# Patient Record
Sex: FEMALE | Race: WHITE | URBAN - METROPOLITAN AREA
[De-identification: names, ages, dates, MRNs, and addresses within clinical notes are randomized per-mention and may not be internally consistent; named-entity substitution may affect disease eponyms.]

---

## 2018-11-08 ENCOUNTER — TELEPHONE (OUTPATIENT)
Dept: FAMILY MEDICINE CLINIC | Facility: CLINIC | Age: 63
End: 2018-11-08

## 2018-11-08 ENCOUNTER — OFFICE VISIT (OUTPATIENT)
Dept: FAMILY MEDICINE CLINIC | Facility: CLINIC | Age: 63
End: 2018-11-08
Payer: COMMERCIAL

## 2018-11-08 VITALS
SYSTOLIC BLOOD PRESSURE: 148 MMHG | TEMPERATURE: 98 F | WEIGHT: 170.25 LBS | RESPIRATION RATE: 16 BRPM | HEIGHT: 65 IN | DIASTOLIC BLOOD PRESSURE: 82 MMHG | BODY MASS INDEX: 28.36 KG/M2 | HEART RATE: 72 BPM

## 2018-11-08 DIAGNOSIS — M54.50 ACUTE RIGHT-SIDED LOW BACK PAIN WITHOUT SCIATICA: Primary | ICD-10-CM

## 2018-11-08 DIAGNOSIS — R10.9 FLANK PAIN: ICD-10-CM

## 2018-11-08 PROCEDURE — 99203 OFFICE O/P NEW LOW 30 MIN: CPT | Performed by: NURSE PRACTITIONER

## 2018-11-08 PROCEDURE — 81003 URINALYSIS AUTO W/O SCOPE: CPT | Performed by: NURSE PRACTITIONER

## 2018-11-08 RX ORDER — CYCLOBENZAPRINE HCL 10 MG
10 TABLET ORAL NIGHTLY
Qty: 30 TABLET | Refills: 1 | Status: SHIPPED | OUTPATIENT
Start: 2018-11-08 | End: 2018-11-28

## 2018-11-08 RX ORDER — METHYLPREDNISOLONE 4 MG/1
TABLET ORAL
Qty: 1 KIT | Refills: 0 | Status: SHIPPED | OUTPATIENT
Start: 2018-11-08 | End: 2019-04-08 | Stop reason: ALTCHOICE

## 2018-11-08 NOTE — TELEPHONE ENCOUNTER
Pt states that she started with LLQ abd pain this morning. She states that the pain is constant but does come in waves and increase in intensity. She states that it radiates around to her back. She denies any n/v/d/c. She denies any fever. She states that s

## 2018-11-08 NOTE — PATIENT INSTRUCTIONS
Rest, heat to lower back,  Take muscle relaxant-cyclobenzaprine up to 3 times a day–however, this will make you drowsy, so you may just want to take it at bedtime. Allow at least 8 hours before you need to be up.     Take Medrol Dosepak as directed with fo

## 2018-11-08 NOTE — PROGRESS NOTES
Marty Schultz is a 59year old female.   Patient presents with:  Back Pain: Across back and into the left side of body      HPI:   Complaints of pain to lower back- lefts side- continuous in back with waves of pain - some radiation to abdomen   Felt bett °F (36.8 °C) (Tympanic)   Resp 16   Ht 65\"   Wt 170 lb 4 oz   BMI 28.33 kg/m²   GENERAL: well developed, well nourished,in no apparent distress  SKIN: no rashes,no suspicious lesions  HEENT: atraumatic, normocephalic,ears and throat are clear  NECK: suppl

## 2018-11-13 ENCOUNTER — TELEPHONE (OUTPATIENT)
Dept: FAMILY MEDICINE CLINIC | Facility: CLINIC | Age: 63
End: 2018-11-13

## 2018-11-13 NOTE — TELEPHONE ENCOUNTER
The patient was seen last Thursday for back pain. She was given Cyclobenzaprine and a Medrol pack. The patient would like to know if she should have started feeling better by now.   The patient says her pain is worse when she wakes up in the morning but w

## 2018-11-13 NOTE — TELEPHONE ENCOUNTER
The patient was informed and verbalized understanding. The patient had no further questions or concerns at this time.

## 2018-11-13 NOTE — TELEPHONE ENCOUNTER
Her pain sounds like it may also be arthritis related–would recommend heat, Aleve 1-2 pills twice a day with food–follow-up if symptoms persist or increase or consider x-ray

## 2019-04-08 ENCOUNTER — OFFICE VISIT (OUTPATIENT)
Dept: FAMILY MEDICINE CLINIC | Facility: CLINIC | Age: 64
End: 2019-04-08
Payer: COMMERCIAL

## 2019-04-08 VITALS
WEIGHT: 164.63 LBS | HEIGHT: 65 IN | BODY MASS INDEX: 27.43 KG/M2 | DIASTOLIC BLOOD PRESSURE: 68 MMHG | OXYGEN SATURATION: 97 % | TEMPERATURE: 98 F | SYSTOLIC BLOOD PRESSURE: 132 MMHG | HEART RATE: 74 BPM | RESPIRATION RATE: 14 BRPM

## 2019-04-08 DIAGNOSIS — R29.898 DECREASED RANGE OF MOTION OF NECK: ICD-10-CM

## 2019-04-08 DIAGNOSIS — Z00.00 HEALTH CARE MAINTENANCE: Primary | ICD-10-CM

## 2019-04-08 DIAGNOSIS — I73.9 PERIPHERAL VASCULAR DISEASE (HCC): ICD-10-CM

## 2019-04-08 DIAGNOSIS — Z23 NEED FOR VACCINATION: ICD-10-CM

## 2019-04-08 PROCEDURE — 99396 PREV VISIT EST AGE 40-64: CPT | Performed by: FAMILY MEDICINE

## 2019-04-08 PROCEDURE — 88175 CYTOPATH C/V AUTO FLUID REDO: CPT | Performed by: FAMILY MEDICINE

## 2019-04-08 PROCEDURE — 90715 TDAP VACCINE 7 YRS/> IM: CPT | Performed by: FAMILY MEDICINE

## 2019-04-08 PROCEDURE — 90471 IMMUNIZATION ADMIN: CPT | Performed by: FAMILY MEDICINE

## 2019-04-08 NOTE — PATIENT INSTRUCTIONS
Schedule fasting labs, 12 hours water only  Schedule mammogram  See counselor for anxiety, [facility] here if in network  Consider xrays of neck due to decreased range of motion

## 2019-04-11 ENCOUNTER — TELEPHONE (OUTPATIENT)
Dept: FAMILY MEDICINE CLINIC | Facility: CLINIC | Age: 64
End: 2019-04-11

## 2019-04-11 NOTE — TELEPHONE ENCOUNTER
----- Message from Wesley Galeano MD sent at 4/10/2019  8:07 PM CDT -----  Pap normal  Will not need repeat due to age

## 2019-04-11 NOTE — PROGRESS NOTES
[facility] [place]  PROGRESS NOTE  Chief Complaint:   Patient presents with:  Physical      HPI:   This is a 59year old female coming in for this examination which she has not had an 8 years.   Patient has several chronic medical problems 1 bein bleeding since. Patient's last tetanus has been over 10 years she will receive a booster today. Patient is eligible for both shingles and pneumonia vaccine patient would like to check with insurance regarding coverage prior to receiving.     Results for o 04/09/2019 08:20 AM                                 [facility] [place]                                                       First Screen:          Tara President                                                          Specimen:     ThinPrep Image enlarged nodes or history of splenectomy. PSYCHIATRIC:  Denies depression or anxiety. ENDOCRINOLOGIC:  Denies excessive sweating, cold or heat intolerance, polyuria or polydipsia.   ALLERGIES:  Denies allergic response, history of asthma, sneezing, hives, Mariel Bee was seen today for physical.    Diagnoses and all orders for this visit:    Health care maintenance  -     CBC WITH DIFFERENTIAL WITH PLATELET  -     COMP METABOLIC PANEL (14)  -     LIPID PANEL  -     ASSAY, THYROID STIM HORMONE  -     THINPREP changing symptoms. Patient is to call with any side effects or complications from the treatments as a result of today.      Problem List:  Patient Active Problem List:     Palpitations     Peripheral vascular disease ([address]     Pure hypercholesterolemia

## 2019-04-15 ENCOUNTER — HOSPITAL ENCOUNTER (OUTPATIENT)
Dept: MAMMOGRAPHY | Age: 64
Discharge: HOME | End: 2019-04-15
Attending: FAMILY MEDICINE
Payer: COMMERCIAL

## 2019-04-15 DIAGNOSIS — Z12.39 BREAST CANCER SCREENING: ICD-10-CM

## 2019-04-15 PROCEDURE — 77067 SCR MAMMO BI INCL CAD: CPT | Performed by: FAMILY MEDICINE

## 2019-04-15 PROCEDURE — 77063 BREAST TOMOSYNTHESIS BI: CPT | Performed by: FAMILY MEDICINE

## 2019-04-22 NOTE — TELEPHONE ENCOUNTER
Attempted to call pt, no answer. Not able to leave a message. Letter mailed to [facility].

## 2019-04-24 ENCOUNTER — TELEPHONE (OUTPATIENT)
Dept: FAMILY MEDICINE CLINIC | Facility: CLINIC | Age: 64
End: 2019-04-24

## 2019-04-24 DIAGNOSIS — E78.5 DYSLIPIDEMIA: Primary | ICD-10-CM

## 2019-04-24 NOTE — TELEPHONE ENCOUNTER
----- Message from Danial Gosselin, MD sent at 4/24/2019 12:40 PM CDT -----  Chol mod elevated  Strict diet and exercise  recheck 4 months, may need chol meds  Orders in

## 2019-05-29 ENCOUNTER — HOSPITAL ENCOUNTER (OUTPATIENT)
Dept: GENERAL RADIOLOGY | Age: 64
Discharge: HOME | End: 2019-05-29
Attending: NURSE PRACTITIONER
Payer: COMMERCIAL

## 2019-05-29 ENCOUNTER — OFFICE VISIT (OUTPATIENT)
Dept: FAMILY MEDICINE CLINIC | Facility: CLINIC | Age: 64
End: 2019-05-29
Payer: COMMERCIAL

## 2019-05-29 ENCOUNTER — TELEPHONE (OUTPATIENT)
Dept: FAMILY MEDICINE CLINIC | Facility: CLINIC | Age: 64
End: 2019-05-29

## 2019-05-29 VITALS
OXYGEN SATURATION: 95 % | HEART RATE: 62 BPM | DIASTOLIC BLOOD PRESSURE: 80 MMHG | WEIGHT: 166 LBS | SYSTOLIC BLOOD PRESSURE: 122 MMHG | BODY MASS INDEX: 27.66 KG/M2 | HEIGHT: 65 IN | TEMPERATURE: 97 F

## 2019-05-29 DIAGNOSIS — M25.561 ACUTE PAIN OF RIGHT KNEE: ICD-10-CM

## 2019-05-29 DIAGNOSIS — M25.561 ACUTE PAIN OF RIGHT KNEE: Primary | ICD-10-CM

## 2019-05-29 PROCEDURE — 99214 OFFICE O/P EST MOD 30 MIN: CPT | Performed by: NURSE PRACTITIONER

## 2019-05-29 PROCEDURE — 73560 X-RAY EXAM OF KNEE 1 OR 2: CPT | Performed by: NURSE PRACTITIONER

## 2019-05-29 RX ORDER — MELOXICAM 15 MG/1
15 TABLET ORAL DAILY
Qty: 30 TABLET | Refills: 3 | Status: SHIPPED | OUTPATIENT
Start: 2019-05-29 | End: 2019-08-14

## 2019-05-29 NOTE — TELEPHONE ENCOUNTER
Please notify patient that her ultrasound is negative for a blood clot–continue with treatment as we discussed–if knee is not improving would recommend orthopedic consultation.

## 2019-05-29 NOTE — PROGRESS NOTES
Juan Villegas is a 62year old female. Patient presents with:  Knee Pain: right      HPI:   Complaints of pain to right knee.  A month ago started hurting   States she works at [employer]- on her feet all day   States she was going up and down stairs for 2 distress  SKIN: no rashes,no suspicious lesions  LUNGS: normal rate without respiratory distress, lungs clear to auscultation  CARDIO: RRR without murmur, no edema  MUSCULOSKELETAL: Right knee–slight joint enlargement noted arthritic appearance.   No erythe

## 2019-05-29 NOTE — PATIENT INSTRUCTIONS
Go to [facility] for an ultrasound of your right leg. 915 this morning. Rule out blood clot. Take meloxicam 15 mg 1 by mouth daily with food.     Wear supportive shoes, ice and heat to the knee, wear knee brace, recommend orthopedic referral

## 2019-05-31 ENCOUNTER — TELEPHONE (OUTPATIENT)
Dept: FAMILY MEDICINE CLINIC | Facility: CLINIC | Age: 64
End: 2019-05-31

## 2019-06-03 NOTE — TELEPHONE ENCOUNTER
Pt had x-ray of knee completed on 5/29-  Pt states she had looked at report on my chart. Pt asked what EL thoughts were. Pt informed as per EL that radiology report was consistent with EL findings-discussed with pt at time of visit.   EL note attached to

## 2019-06-03 NOTE — TELEPHONE ENCOUNTER
Phone just keep ringing. I will send letter for the patient to contact our office with a correct phone number.

## 2019-06-12 ENCOUNTER — TELEPHONE (OUTPATIENT)
Dept: FAMILY MEDICINE CLINIC | Facility: CLINIC | Age: 64
End: 2019-06-12

## 2019-06-12 DIAGNOSIS — M25.561 ACUTE PAIN OF RIGHT KNEE: Primary | ICD-10-CM

## 2019-06-12 NOTE — TELEPHONE ENCOUNTER
Patient was seen not long ago for knee pain and was told if the pain doesn't go away that patient would have to see specialist, patient states that she still has pain and would like to know what the next step is.

## 2019-06-12 NOTE — TELEPHONE ENCOUNTER
Pt seen per EL on 5/29- for knee pain. Pt states pain is not getting better. Pt would like referral to Dr. Cristino Pitt in [place]. Pt did check with her insurance. Message given to radiology requesting copy of x-ray for pt to .

## 2019-07-24 ENCOUNTER — OFFICE VISIT (OUTPATIENT)
Dept: FAMILY MEDICINE CLINIC | Facility: CLINIC | Age: 64
End: 2019-07-24
Payer: COMMERCIAL

## 2019-07-24 VITALS
BODY MASS INDEX: 27.82 KG/M2 | SYSTOLIC BLOOD PRESSURE: 134 MMHG | TEMPERATURE: 98 F | OXYGEN SATURATION: 97 % | DIASTOLIC BLOOD PRESSURE: 70 MMHG | RESPIRATION RATE: 18 BRPM | WEIGHT: 167 LBS | HEART RATE: 75 BPM | HEIGHT: 65 IN

## 2019-07-24 DIAGNOSIS — G89.29 CHRONIC PAIN OF RIGHT KNEE: ICD-10-CM

## 2019-07-24 DIAGNOSIS — Z01.810 PREOP CARDIOVASCULAR EXAM: ICD-10-CM

## 2019-07-24 DIAGNOSIS — Z01.818 PREOPERATIVE EXAMINATION: Primary | ICD-10-CM

## 2019-07-24 DIAGNOSIS — M25.561 CHRONIC PAIN OF RIGHT KNEE: ICD-10-CM

## 2019-07-24 PROCEDURE — 99214 OFFICE O/P EST MOD 30 MIN: CPT | Performed by: FAMILY MEDICINE

## 2019-07-24 PROCEDURE — 93000 ELECTROCARDIOGRAM COMPLETE: CPT | Performed by: FAMILY MEDICINE

## 2019-07-24 NOTE — PROGRESS NOTES
[facility]    Chief Complaint:   Patient presents with:  Pre-Op Exam      HPI:   Kelsea Mcdowell is a 62year old female with a hx of R knee pain, who presents for a pre-operative physical exam. Patient is to have R knee ar - 29 U/L   LIPID PANEL   Result Value Ref Range    CHOLESTEROL, TOTAL 248 (H) <200 mg/dL    HDL CHOLESTEROL 71 >50 mg/dL    TRIGLYCERIDES 80 <150 mg/dL    LDL-CHOLESTEROL 159 (H) mg/dL (calc)    CHOL/HDLC RATIO 3.5 <5.0 (calc)    NON-HDL CHOLESTEROL 177 (H [facility],      Received:            04/09/2019 08:20 AM                                 [facility] [place] Screen:          Vargas Ibarra in stool. MUSCULOSKELETAL:  Denies weakness, muscle aches, back pain, joint pain, swelling or stiffness.   NEUROLOGICAL:  Denies headache, seizures, dizziness, syncope, paralysis, ataxia, numbness or tingling in the extremities,change in bowel or bladder c extremity. BACK: No tenderness, no spasm, SLR test negative, FROM. EXTREMITIES:  No edema, no cyanosis, no clubbing, FROM, 2+ dorsalis pedis pulses bilaterally. NEURO:  No deficit, normal gait, strength and tone, sensory intact, normal reflexes.   PSYCH

## 2019-07-24 NOTE — PATIENT INSTRUCTIONS
After today's assessment  patient is at optimum health for surgery and relatively at low risk. There are no contraindication for procedure. Avoid any Aleve, aspirin, ibuprofen, meloxicam 1 week before surgery.

## 2019-08-14 ENCOUNTER — TELEPHONE (OUTPATIENT)
Dept: FAMILY MEDICINE CLINIC | Facility: CLINIC | Age: 64
End: 2019-08-14

## 2019-08-14 RX ORDER — MELOXICAM 15 MG/1
15 TABLET ORAL DAILY
Qty: 30 TABLET | Refills: 3 | Status: SHIPPED | OUTPATIENT
Start: 2019-08-14 | End: 2020-03-23

## 2019-08-14 NOTE — TELEPHONE ENCOUNTER
Requesting refill for Meloxicam sent to [facility] in [place]

## 2019-08-14 NOTE — TELEPHONE ENCOUNTER
Please advise refill of Meloxicam 15mg.   Last Rx: 5/29/19 - saw Lucas Stanley    Future appt:    Last Appointment with provider:   4/8/2019  Last appointment at [facility]:  7/24/2019  CHOLESTEROL, TOTAL (mg/dL)   Date Value   04/23/2019 248 (H)     HDL CHOLEST

## 2020-03-16 ENCOUNTER — TELEPHONE (OUTPATIENT)
Dept: FAMILY MEDICINE CLINIC | Facility: CLINIC | Age: 65
End: 2020-03-16

## 2020-03-16 DIAGNOSIS — Z12.11 COLON CANCER SCREENING: Primary | ICD-10-CM

## 2020-03-23 RX ORDER — MELOXICAM 15 MG/1
TABLET ORAL
Qty: 90 TABLET | Refills: 0 | Status: SHIPPED | OUTPATIENT
Start: 2020-03-23 | End: 2020-09-03

## 2020-03-23 NOTE — TELEPHONE ENCOUNTER
No future appointments. Meloxicam 15 MG Oral Tab 30 tablet 3 8/14/2019    Sig:   Take 1 tablet (15 mg total) by mouth daily. Route:   Oral     Order #:   [id number]       Please Advise. Thank you!     Future appt:    Last Appointment with provider:

## 2020-05-11 ENCOUNTER — TELEPHONE (OUTPATIENT)
Dept: FAMILY MEDICINE CLINIC | Facility: CLINIC | Age: 65
End: 2020-05-11

## 2020-05-11 PROCEDURE — 99442 PHONE E/M BY PHYS 11-20 MIN: CPT | Performed by: FAMILY MEDICINE

## 2020-05-11 RX ORDER — METAXALONE 800 MG/1
800 TABLET ORAL 3 TIMES DAILY
Qty: 30 TABLET | Refills: 1 | Status: SHIPPED | OUTPATIENT
Start: 2020-05-11 | End: 2020-05-31

## 2020-05-11 NOTE — TELEPHONE ENCOUNTER
Called patient due to her pain in her neck. Telephone Information:   Home Phone [phone]   Mobile [phone]      Kat Elena  verbally consents to a Virtual/Telephone Check-In service on 5/11/2020.     Patient understands and accepts financial

## 2020-06-24 ENCOUNTER — TELEPHONE (OUTPATIENT)
Dept: FAMILY MEDICINE CLINIC | Facility: CLINIC | Age: 65
End: 2020-06-24

## 2020-06-24 NOTE — TELEPHONE ENCOUNTER
[facility] requested an urgent  request for a copy from the last 24 months of pt's medical records. This was sent as an urgent request to [employer] as it is needed for pre-surgical review.

## 2020-07-17 ENCOUNTER — TELEPHONE (OUTPATIENT)
Dept: FAMILY MEDICINE CLINIC | Facility: CLINIC | Age: 65
End: 2020-07-17

## 2020-07-21 NOTE — TELEPHONE ENCOUNTER
Received a medical records request from [place] for records from the past 2 years. I printed medication list, 5/29/2019 knee xray, 11/8/2018 & 4/8/2019 & 5/29/2019 & 7/24/2019 office notes, & 7/24/2019 EKG. I faxed the records to [phone].   Faxed con

## 2020-08-04 ENCOUNTER — TELEPHONE (OUTPATIENT)
Dept: FAMILY MEDICINE CLINIC | Facility: CLINIC | Age: 65
End: 2020-08-04

## 2020-08-04 NOTE — TELEPHONE ENCOUNTER
mutual patient   RE:  R knee replacement      her ins needs local particapation in some health care       call her back for more details  please       No future appointments.

## 2020-08-07 NOTE — TELEPHONE ENCOUNTER
Yes I should be able to see her for preop and after postop. Make sure they send us a preop order and also postop instructions clearly.

## 2020-08-07 NOTE — TELEPHONE ENCOUNTER
Andressa Phillips from [facility] is calling stating that patient has given them Dr Isabela Cooper name for a PCP. Patient is trying to go through a program with her insurance [facility] for a knee replacement.  Andressa Phillips states that the program with her in

## 2020-08-07 NOTE — TELEPHONE ENCOUNTER
----- Message from Derik Dupont sent at 8/7/2020  3:28 PM CDT -----  Joao Siegel - Please call back at [phone]

## 2020-08-07 NOTE — TELEPHONE ENCOUNTER
Let Chantelle Rendon know the following below. Chantelle Rendon verbalized her understanding and had no other questions at this time.

## 2020-09-01 ENCOUNTER — TELEPHONE (OUTPATIENT)
Dept: FAMILY MEDICINE CLINIC | Facility: CLINIC | Age: 65
End: 2020-09-01

## 2020-09-01 NOTE — TELEPHONE ENCOUNTER
[place] is scheduled for a right total knee replacement on 10/22/20 w/ Dr. Mason Garcia, will be faxing letter w/ pre-ops  No future appointments.

## 2020-09-03 RX ORDER — MELOXICAM 15 MG/1
TABLET ORAL
Qty: 90 TABLET | Refills: 0 | Status: SHIPPED | OUTPATIENT
Start: 2020-09-03 | End: 2020-12-23

## 2020-09-03 NOTE — TELEPHONE ENCOUNTER
Future appt:     Your appointments     Date & Time Appointment Department [facility])    Sep 30, 2020  2:00 PM CDT Presurgical Visit with Marcel Ramsey, [address], Ivette Dumont ([facility])            [facility]

## 2020-09-25 ENCOUNTER — OFFICE VISIT (OUTPATIENT)
Dept: FAMILY MEDICINE CLINIC | Facility: CLINIC | Age: 65
End: 2020-09-25
Payer: COMMERCIAL

## 2020-09-25 ENCOUNTER — TELEPHONE (OUTPATIENT)
Dept: FAMILY MEDICINE CLINIC | Facility: CLINIC | Age: 65
End: 2020-09-25

## 2020-09-25 ENCOUNTER — HOSPITAL ENCOUNTER (OUTPATIENT)
Dept: GENERAL RADIOLOGY | Age: 65
Discharge: HOME | End: 2020-09-25
Attending: NURSE PRACTITIONER
Payer: COMMERCIAL

## 2020-09-25 VITALS
SYSTOLIC BLOOD PRESSURE: 112 MMHG | RESPIRATION RATE: 20 BRPM | OXYGEN SATURATION: 96 % | DIASTOLIC BLOOD PRESSURE: 64 MMHG | WEIGHT: 158.81 LBS | HEIGHT: 65 IN | HEART RATE: 71 BPM | TEMPERATURE: 98 F | BODY MASS INDEX: 26.46 KG/M2

## 2020-09-25 DIAGNOSIS — M79.672 LEFT FOOT PAIN: ICD-10-CM

## 2020-09-25 DIAGNOSIS — M79.672 LEFT FOOT PAIN: Primary | ICD-10-CM

## 2020-09-25 PROCEDURE — 73630 X-RAY EXAM OF FOOT: CPT | Performed by: NURSE PRACTITIONER

## 2020-09-25 PROCEDURE — 99213 OFFICE O/P EST LOW 20 MIN: CPT | Performed by: NURSE PRACTITIONER

## 2020-09-25 PROCEDURE — 3078F DIAST BP <80 MM HG: CPT | Performed by: NURSE PRACTITIONER

## 2020-09-25 PROCEDURE — 3074F SYST BP LT 130 MM HG: CPT | Performed by: NURSE PRACTITIONER

## 2020-09-25 PROCEDURE — 3008F BODY MASS INDEX DOCD: CPT | Performed by: NURSE PRACTITIONER

## 2020-09-25 RX ORDER — ELECTROLYTES/DEXTROSE
2 SOLUTION, ORAL ORAL DAILY
COMMUNITY

## 2020-09-25 RX ORDER — ACETAMINOPHEN 160 MG/5ML
1 SUSPENSION, ORAL (FINAL DOSE FORM) ORAL 2 TIMES DAILY
COMMUNITY

## 2020-09-25 NOTE — PROGRESS NOTES
[address]  PROGRESS NOTE  Chief Complaint:   Patient presents with: Foot Pain: L foot pain x3 days - no injury that she knows of      HPI:   This is a 60year old female coming in for left foot pain.     Symptoms started Sunday, actuall tongue daily. • MELOXICAM 15 MG Oral Tab Take 1 tablet by mouth once daily 90 tablet 0      Counseling given: Not Answered       REVIEW OF SYSTEMS:   CONSTITUTIONAL:  Denies unusual weight gain/loss, fever, chills, or fatigue.   INTEGUMENTARY:  Denies r foot with mild tenderness with palpation along the dorsal medial though slightly lateral left foot along the 1st proximal to mid metatarsal.  NEURO:  No deficit, normal gait, strength and tone, sensory intact, normal reflexes.     ASSESSMENT AND PLAN:     1 speech recognition software.  Please excuse any grammatical errors. Call my office if you have any questions regarding this note.

## 2020-09-25 NOTE — TELEPHONE ENCOUNTER
----- Message from Aramis JEANETTE Georges sent at 9/25/2020 12:37 PM CDT -----  No acute fracture noted on xray. It is possible small stress fracture can be present and not show on imaging.    Try conservative treatments at this time as outlined in AVS (ic

## 2020-09-25 NOTE — PATIENT INSTRUCTIONS
Left foot xray today. Ice your left foot for 20 minutes four times a day. Warm water epsom soaks to left foot twice a day. Continue with your meloxicam as ordered.   Keep left foot elevated, non-weight bearing to left foot as able (I know difficult due t

## 2020-09-30 ENCOUNTER — OFFICE VISIT (OUTPATIENT)
Dept: FAMILY MEDICINE CLINIC | Facility: CLINIC | Age: 65
End: 2020-09-30
Payer: COMMERCIAL

## 2020-09-30 ENCOUNTER — TELEPHONE (OUTPATIENT)
Dept: FAMILY MEDICINE CLINIC | Facility: CLINIC | Age: 65
End: 2020-09-30

## 2020-09-30 VITALS
BODY MASS INDEX: 26.46 KG/M2 | WEIGHT: 158.81 LBS | SYSTOLIC BLOOD PRESSURE: 110 MMHG | HEART RATE: 84 BPM | HEIGHT: 65 IN | DIASTOLIC BLOOD PRESSURE: 58 MMHG | TEMPERATURE: 97 F | RESPIRATION RATE: 16 BRPM

## 2020-09-30 DIAGNOSIS — M25.561 CHRONIC PAIN OF RIGHT KNEE: ICD-10-CM

## 2020-09-30 DIAGNOSIS — G89.29 CHRONIC PAIN OF RIGHT KNEE: ICD-10-CM

## 2020-09-30 DIAGNOSIS — Z01.818 PREOPERATIVE EXAMINATION: Primary | ICD-10-CM

## 2020-09-30 PROCEDURE — 99214 OFFICE O/P EST MOD 30 MIN: CPT | Performed by: FAMILY MEDICINE

## 2020-09-30 PROCEDURE — 3074F SYST BP LT 130 MM HG: CPT | Performed by: FAMILY MEDICINE

## 2020-09-30 PROCEDURE — 3008F BODY MASS INDEX DOCD: CPT | Performed by: FAMILY MEDICINE

## 2020-09-30 PROCEDURE — 93000 ELECTROCARDIOGRAM COMPLETE: CPT | Performed by: FAMILY MEDICINE

## 2020-09-30 PROCEDURE — 3078F DIAST BP <80 MM HG: CPT | Performed by: FAMILY MEDICINE

## 2020-09-30 NOTE — PROGRESS NOTES
[facility]  [facility]    Chief Complaint:   Patient presents with:  Pre-Op: [facility]/       HPI:   Juan Villegas is a 60year old female with a hx of R knee pain, who presents for a pre-operative physical exam. Luis chest pain, chest pressure, chest discomfort, palpitations, edema, dyspnea on exertion or at rest.  RESPIRATORY:  Denies shortness of breath, wheezing, cough or sputum.   GASTROINTESTINAL:  Denies abdominal pain, nausea, vomiting, constipation, diarrhea, or gallops. LUNGS: Clear to auscultation bilterally, no rales/rhonchi/wheezing. CHEST: No tenderness. ABDOMEN:  Soft, nondistended, nontender, bowel sounds normal in all 4 quadrants, no masses, no hepatosplenomegaly.   MUSCULOSKELETAL: Normal ROM, no joint Palpitations     Peripheral vascular disease ([facility]     Pure hypercholesterolemia      Tal Rossi MD  9/30/2020  2:38 PM    This note was created utilizing Dragon speech recognition software.  Please excuse any grammatical errors.  Call my office if you h

## 2020-09-30 NOTE — TELEPHONE ENCOUNTER
Patient asking if [facility] faxed the PreOp Orders. Informed-yes. Informed orders faxed to [employer].  Informed copy of lab orders left at  to   Jeremiah Bray up/agreed.   Mallory Bahena, 09/30/20, 5:19 PM

## 2020-10-07 ENCOUNTER — TELEPHONE (OUTPATIENT)
Dept: FAMILY MEDICINE CLINIC | Facility: CLINIC | Age: 65
End: 2020-10-07

## 2020-10-07 NOTE — TELEPHONE ENCOUNTER
PreOp Visit/EKG faxed to [facility]. Patient has appt with [facility] Monday 10/12 for PreOp Labs. Informed Alfaro will be faxed once complete.   Hui Miller, 10/07/20, 2:06 PM

## 2020-10-13 ENCOUNTER — TELEPHONE (OUTPATIENT)
Dept: FAMILY MEDICINE CLINIC | Facility: CLINIC | Age: 65
End: 2020-10-13

## 2020-10-13 NOTE — TELEPHONE ENCOUNTER
Faxed pre-op labs to [facility] [phone]

## 2020-10-13 NOTE — TELEPHONE ENCOUNTER
----- Message from Emre Pozo MD sent at 10/13/2020 11:09 AM CDT -----  Please fax preop H&P, EKG and lab results to surgeon. Yes

## 2020-11-13 ENCOUNTER — OFFICE VISIT (OUTPATIENT)
Dept: FAMILY MEDICINE CLINIC | Facility: CLINIC | Age: 65
End: 2020-11-13
Payer: COMMERCIAL

## 2020-11-13 VITALS
DIASTOLIC BLOOD PRESSURE: 68 MMHG | WEIGHT: 147 LBS | SYSTOLIC BLOOD PRESSURE: 122 MMHG | HEIGHT: 65 IN | RESPIRATION RATE: 16 BRPM | HEART RATE: 100 BPM | BODY MASS INDEX: 24.49 KG/M2 | TEMPERATURE: 99 F | OXYGEN SATURATION: 97 %

## 2020-11-13 DIAGNOSIS — M25.562 CHRONIC PAIN OF BOTH KNEES: Primary | ICD-10-CM

## 2020-11-13 DIAGNOSIS — G89.29 CHRONIC PAIN OF BOTH KNEES: Primary | ICD-10-CM

## 2020-11-13 DIAGNOSIS — M17.0 PRIMARY OSTEOARTHRITIS OF BOTH KNEES: ICD-10-CM

## 2020-11-13 DIAGNOSIS — Z96.651 HISTORY OF RIGHT KNEE JOINT REPLACEMENT: ICD-10-CM

## 2020-11-13 DIAGNOSIS — M25.561 CHRONIC PAIN OF BOTH KNEES: Primary | ICD-10-CM

## 2020-11-13 PROBLEM — M25.569 KNEE PAIN: Status: ACTIVE | Noted: 2020-11-13

## 2020-11-13 PROBLEM — M17.9 OSTEOARTHRITIS OF KNEE: Status: ACTIVE | Noted: 2020-10-22

## 2020-11-13 PROBLEM — M17.10 OSTEOARTHRITIS OF KNEE: Status: ACTIVE | Noted: 2020-10-22

## 2020-11-13 PROCEDURE — 3008F BODY MASS INDEX DOCD: CPT | Performed by: FAMILY MEDICINE

## 2020-11-13 PROCEDURE — 99213 OFFICE O/P EST LOW 20 MIN: CPT | Performed by: FAMILY MEDICINE

## 2020-11-13 PROCEDURE — 3074F SYST BP LT 130 MM HG: CPT | Performed by: FAMILY MEDICINE

## 2020-11-13 PROCEDURE — 3078F DIAST BP <80 MM HG: CPT | Performed by: FAMILY MEDICINE

## 2020-11-13 RX ORDER — OMEPRAZOLE 10 MG/1
10 CAPSULE, DELAYED RELEASE ORAL
COMMUNITY
Start: 2020-10-22 | End: 2020-12-23

## 2020-11-13 RX ORDER — ASPIRIN 81 MG/1
81 TABLET, CHEWABLE ORAL 2 TIMES DAILY
COMMUNITY
Start: 2020-10-22 | End: 2020-11-21

## 2020-11-14 NOTE — PROGRESS NOTES
[address]  PROGRESS NOTE  Chief Complaint:   Patient presents with: Follow - Up: knee replacement chalo right knee      HPI:   This is a 60year old female coming in for follow-up of her knee surgery.   She had right knee replacement d = 60 mL/min/1.73m2    BUN/CREATININE RATIO NOT APPLICABLE 6 - 22 (calc)    SODIUM 138 135 - 146 mmol/L    POTASSIUM 4.2 3.5 - 5.3 mmol/L    CHLORIDE 103 98 - 110 mmol/L    CARBON DIOXIDE 29 20 - 32 mmol/L    CALCIUM 9.2 8.6 - 10.4 mg/dL    PROTEIN, TOTAL 6 aspirin 81 MG Oral Chew Tab Chew 81 mg by mouth 2 (two) times daily. • Omeprazole 10 MG Oral Capsule Delayed Release Take 10 mg by mouth. • Multiple Vitamins-Minerals (MULTIVITAMIN ADULT) Oral Tab Take 2 each by mouth daily.      • [address] discharge Ears: External normal. Nose: patent, no nasal discharge Throat:  No tonsillar erythema or exudate. Mouth:  No oral lesions or ulcerations, good dentition. NECK: Supple, no CLAD, no JVD, no thyromegaly.   SKIN: No rashes, no skin lesion, no bruis replacement      Tal Best MD  11/13/2020  8:25 PM

## 2020-12-18 ENCOUNTER — TELEPHONE (OUTPATIENT)
Dept: FAMILY MEDICINE CLINIC | Facility: CLINIC | Age: 65
End: 2020-12-18

## 2020-12-18 NOTE — TELEPHONE ENCOUNTER
needs 3 mo f/up knee xray for post op ck  ( made her a dr gonzalez on /1/22  )   feels she does not need to see Dr Rudy Aquino  - no xray orders in [facility]      please advise      Future Appointments   Date Time Provider Lisa Latham   1/22/2021 10:00 AM Ashley

## 2020-12-23 ENCOUNTER — TELEPHONE (OUTPATIENT)
Dept: FAMILY MEDICINE CLINIC | Facility: CLINIC | Age: 65
End: 2020-12-23

## 2020-12-23 ENCOUNTER — OFFICE VISIT (OUTPATIENT)
Dept: FAMILY MEDICINE CLINIC | Facility: CLINIC | Age: 65
End: 2020-12-23
Payer: COMMERCIAL

## 2020-12-23 ENCOUNTER — HOSPITAL ENCOUNTER (OUTPATIENT)
Dept: GENERAL RADIOLOGY | Age: 65
Discharge: HOME | End: 2020-12-23
Attending: FAMILY MEDICINE
Payer: COMMERCIAL

## 2020-12-23 VITALS
BODY MASS INDEX: 24.16 KG/M2 | DIASTOLIC BLOOD PRESSURE: 70 MMHG | SYSTOLIC BLOOD PRESSURE: 114 MMHG | HEART RATE: 84 BPM | HEIGHT: 65 IN | OXYGEN SATURATION: 96 % | RESPIRATION RATE: 16 BRPM | TEMPERATURE: 98 F | WEIGHT: 145 LBS

## 2020-12-23 DIAGNOSIS — M25.561 ACUTE PAIN OF RIGHT KNEE: Primary | ICD-10-CM

## 2020-12-23 DIAGNOSIS — Z96.651 STATUS POST RIGHT KNEE REPLACEMENT: ICD-10-CM

## 2020-12-23 DIAGNOSIS — M25.561 ACUTE PAIN OF RIGHT KNEE: ICD-10-CM

## 2020-12-23 PROCEDURE — 3008F BODY MASS INDEX DOCD: CPT | Performed by: FAMILY MEDICINE

## 2020-12-23 PROCEDURE — 73560 X-RAY EXAM OF KNEE 1 OR 2: CPT | Performed by: FAMILY MEDICINE

## 2020-12-23 PROCEDURE — 3074F SYST BP LT 130 MM HG: CPT | Performed by: FAMILY MEDICINE

## 2020-12-23 PROCEDURE — 3078F DIAST BP <80 MM HG: CPT | Performed by: FAMILY MEDICINE

## 2020-12-23 PROCEDURE — 99214 OFFICE O/P EST MOD 30 MIN: CPT | Performed by: FAMILY MEDICINE

## 2020-12-23 NOTE — TELEPHONE ENCOUNTER
had knee replacement at [facility]     upon speaking to her Insurance Co   they recommend her to speak with Dr Justine Gaming to order Physical Therapy      how should she proceed?    please advise        Future Appointments   Date Time Provider Lisa Latham   1/22/

## 2020-12-23 NOTE — PROGRESS NOTES
[facility] [place]  PROGRESS NOTE  Chief Complaint:   Patient presents with:  Post-Op  Knee Pain      HPI:   This is a 60year old female presents to clinic for evaluation of right knee pain.   Patient is status post right knee replacement 3 aliya dryness. CARDIOVASCULAR:  Denies chest pain, chest pressure, chest discomfort, palpitations, edema, dyspnea on exertion or at rest.  RESPIRATORY:  Denies shortness of breath, wheezing, cough or sputum.   GASTROINTESTINAL:  Denies abdominal pain, nausea, vo EXTERNAL        Patient Instructions   Recommend rest, ice pack and aleve as needed   Check xray today. Start physical therapy. Call orthopedics surgeon if pain gets worse.          Health Maintenance:  FIT Colorectal Screening due on 05/17/2006  Jennifer

## 2020-12-23 NOTE — PATIENT INSTRUCTIONS
Recommend rest, ice pack and aleve as needed   Check xray today. Start physical therapy. Call orthopedics surgeon if pain gets worse.

## 2021-01-22 ENCOUNTER — OFFICE VISIT (OUTPATIENT)
Dept: FAMILY MEDICINE CLINIC | Facility: CLINIC | Age: 66
End: 2021-01-22
Payer: COMMERCIAL

## 2021-01-22 VITALS
DIASTOLIC BLOOD PRESSURE: 72 MMHG | OXYGEN SATURATION: 99 % | WEIGHT: 150 LBS | TEMPERATURE: 99 F | SYSTOLIC BLOOD PRESSURE: 118 MMHG | HEIGHT: 65 IN | RESPIRATION RATE: 16 BRPM | BODY MASS INDEX: 24.99 KG/M2 | HEART RATE: 98 BPM

## 2021-01-22 DIAGNOSIS — Z00.00 PHYSICAL EXAM: Primary | ICD-10-CM

## 2021-01-22 DIAGNOSIS — R25.1 TREMOR OF LEFT HAND: ICD-10-CM

## 2021-01-22 DIAGNOSIS — Z12.11 COLON CANCER SCREENING: ICD-10-CM

## 2021-01-22 DIAGNOSIS — R29.898 WEAKNESS OF LEFT ARM: ICD-10-CM

## 2021-01-22 DIAGNOSIS — Z12.31 BREAST CANCER SCREENING BY MAMMOGRAM: ICD-10-CM

## 2021-01-22 PROCEDURE — 99396 PREV VISIT EST AGE 40-64: CPT | Performed by: FAMILY MEDICINE

## 2021-01-22 PROCEDURE — 3008F BODY MASS INDEX DOCD: CPT | Performed by: FAMILY MEDICINE

## 2021-01-22 PROCEDURE — 3078F DIAST BP <80 MM HG: CPT | Performed by: FAMILY MEDICINE

## 2021-01-22 PROCEDURE — 3074F SYST BP LT 130 MM HG: CPT | Performed by: FAMILY MEDICINE

## 2021-01-22 NOTE — PATIENT INSTRUCTIONS
Recommend healthy diet, exercise   Continue with physical therapy. Check fasting labs at [facility].   See neurologist.   Return to clinic if any concern.      Schedule mammogram and check FIT test.

## 2021-01-22 NOTE — PROGRESS NOTES
[address]    Chief Complaint:   Patient presents with:  Physical: 3 month post op exam/knee replacement      HPI:     Romero Perez is a 60year old female who presents for an Annual Health Visit.     Patient is status post knee re BROWN; no palpitations   GI: denies nausea, vomiting, constipation, diarrhea; no rectal bleeding; no heartburn  GYNE/: no dysuria, urgency or frequency; no hx abnormal pap smear; no vaginal discharge; periods regular; no urinary incontinence  MUSCULOSKELET physical.    Diagnoses and all orders for this visit:    Physical exam  -     CBC WITH DIFFERENTIAL WITH PLATELET  -     COMP METABOLIC PANEL (14)  -     TSH W REFLEX TO FREE T4  -     HEMOGLOBIN A1C  -     LIPID PANEL  -     URINALYSIS, ROUTINE    Weaknes

## 2021-02-03 ENCOUNTER — APPOINTMENT (OUTPATIENT)
Dept: LAB | Age: 66
End: 2021-02-03
Attending: FAMILY MEDICINE
Payer: COMMERCIAL

## 2021-02-03 ENCOUNTER — TELEPHONE (OUTPATIENT)
Dept: FAMILY MEDICINE CLINIC | Facility: CLINIC | Age: 66
End: 2021-02-03

## 2021-02-03 PROCEDURE — 82274 ASSAY TEST FOR BLOOD FECAL: CPT | Performed by: FAMILY MEDICINE

## 2021-02-03 NOTE — TELEPHONE ENCOUNTER
Spoke to pt and informed her that in order to complete her return to work form I will need to help her make an appt. Pt made an appt and has no further questions.

## 2021-02-04 LAB — HEMOCCULT STL QL: NEGATIVE

## 2021-02-05 ENCOUNTER — OFFICE VISIT (OUTPATIENT)
Dept: FAMILY MEDICINE CLINIC | Facility: CLINIC | Age: 66
End: 2021-02-05
Payer: COMMERCIAL

## 2021-02-05 VITALS
HEIGHT: 65 IN | HEART RATE: 100 BPM | OXYGEN SATURATION: 99 % | BODY MASS INDEX: 24.32 KG/M2 | DIASTOLIC BLOOD PRESSURE: 68 MMHG | RESPIRATION RATE: 16 BRPM | SYSTOLIC BLOOD PRESSURE: 116 MMHG | WEIGHT: 146 LBS | TEMPERATURE: 98 F

## 2021-02-05 DIAGNOSIS — Z96.651 STATUS POST RIGHT KNEE REPLACEMENT: ICD-10-CM

## 2021-02-05 DIAGNOSIS — M25.561 ACUTE PAIN OF RIGHT KNEE: Primary | ICD-10-CM

## 2021-02-05 PROCEDURE — 3008F BODY MASS INDEX DOCD: CPT | Performed by: FAMILY MEDICINE

## 2021-02-05 PROCEDURE — 3078F DIAST BP <80 MM HG: CPT | Performed by: FAMILY MEDICINE

## 2021-02-05 PROCEDURE — 99213 OFFICE O/P EST LOW 20 MIN: CPT | Performed by: FAMILY MEDICINE

## 2021-02-05 PROCEDURE — 3074F SYST BP LT 130 MM HG: CPT | Performed by: FAMILY MEDICINE

## 2021-02-05 RX ORDER — MELOXICAM 15 MG/1
15 TABLET ORAL DAILY
COMMUNITY

## 2021-02-05 NOTE — PROGRESS NOTES
[address]  PROGRESS NOTE  Chief Complaint:   Patient presents with: Follow - Up: f/u pt has a form that needs to be completed      HPI:   This is a 60year old female presents to clinic for follow-up from a right knee pain.   Patient wa dyspnea on exertion or at rest.  RESPIRATORY:  Denies shortness of breath, wheezing, cough or sputum. GASTROINTESTINAL:  Denies abdominal pain, nausea, vomiting, constipation, diarrhea, or blood in stool.   MUSCULOSKELETAL: See HPI  NEUROLOGICAL:  Denies h with physical therapy. Pain medication as needed   May return to work next week. Return to clinic if any concern.          Health Maintenance:  Colonoscopy due on 05/17/2006  Zoster Vaccines(1 of 2) due on 05/17/2006  Mammogram due on 04/15/2020  Influe

## 2021-02-05 NOTE — PATIENT INSTRUCTIONS
Knee pain improving. Continue with physical therapy. Pain medication as needed   May return to work next week. Return to clinic if any concern.

## 2021-02-10 ENCOUNTER — TELEPHONE (OUTPATIENT)
Dept: FAMILY MEDICINE CLINIC | Facility: CLINIC | Age: 66
End: 2021-02-10

## 2021-02-10 LAB
ABSOLUTE BASOPHILS: 21 CELLS/UL (ref 0–200)
ABSOLUTE EOSINOPHILS: 151 CELLS/UL (ref 15–500)
ABSOLUTE LYMPHOCYTES: 2522 CELLS/UL (ref 850–3900)
ABSOLUTE MONOCYTES: 598 CELLS/UL (ref 200–950)
ABSOLUTE NEUTROPHILS: 1908 CELLS/UL (ref 1500–7800)
ALBUMIN/GLOBULIN RATIO: 1.4 (CALC) (ref 1–2.5)
ALBUMIN: 4.2 G/DL (ref 3.6–5.1)
ALKALINE PHOSPHATASE: 84 U/L (ref 37–153)
ALT: 12 U/L (ref 6–29)
AST: 18 U/L (ref 10–35)
BASOPHILS: 0.4 %
BILIRUBIN, TOTAL: 0.4 MG/DL (ref 0.2–1.2)
BILIRUBIN: NEGATIVE
BUN: 17 MG/DL (ref 7–25)
CALCIUM: 9.5 MG/DL (ref 8.6–10.4)
CARBON DIOXIDE: 30 MMOL/L (ref 20–32)
CHLORIDE: 103 MMOL/L (ref 98–110)
CHOL/HDLC RATIO: 3.7 (CALC)
CHOLESTEROL, TOTAL: 273 MG/DL
COLOR: YELLOW
CREATININE: 0.83 MG/DL (ref 0.5–0.99)
EGFR IF AFRICN AM: 86 ML/MIN/1.73M2
EGFR IF NONAFRICN AM: 75 ML/MIN/1.73M2
EOSINOPHILS: 2.9 %
GLOBULIN: 2.9 G/DL (CALC) (ref 1.9–3.7)
GLUCOSE: 89 MG/DL (ref 65–99)
GLUCOSE: NEGATIVE
HDL CHOLESTEROL: 73 MG/DL
HEMATOCRIT: 39.7 % (ref 35–45)
HEMOGLOBIN A1C: 5.5 % OF TOTAL HGB
HEMOGLOBIN: 13.3 G/DL (ref 11.7–15.5)
KETONES: NEGATIVE
LDL-CHOLESTEROL: 178 MG/DL (CALC)
LYMPHOCYTES: 48.5 %
MCH: 30.6 PG (ref 27–33)
MCHC: 33.5 G/DL (ref 32–36)
MCV: 91.5 FL (ref 80–100)
MONOCYTES: 11.5 %
MPV: 10.1 FL (ref 7.5–12.5)
NEUTROPHILS: 36.7 %
NITRITE: NEGATIVE
NON-HDL CHOLESTEROL: 200 MG/DL (CALC)
OCCULT BLOOD: NEGATIVE
PH: 5.5 (ref 5–8)
PLATELET COUNT: 295 THOUSAND/UL (ref 140–400)
POTASSIUM: 4.3 MMOL/L (ref 3.5–5.3)
PROTEIN, TOTAL: 7.1 G/DL (ref 6.1–8.1)
PROTEIN: NEGATIVE
RDW: 12.1 % (ref 11–15)
RED BLOOD CELL COUNT: 4.34 MILLION/UL (ref 3.8–5.1)
SODIUM: 140 MMOL/L (ref 135–146)
SPECIFIC GRAVITY: 1.02 (ref 1–1.03)
TRIGLYCERIDES: 103 MG/DL
TSH W/REFLEX TO FT4: 2.71 MIU/L (ref 0.4–4.5)
WHITE BLOOD CELL COUNT: 5.2 THOUSAND/UL (ref 3.8–10.8)

## 2021-02-11 RX ORDER — ATORVASTATIN CALCIUM 10 MG/1
10 TABLET, FILM COATED ORAL NIGHTLY
Qty: 30 TABLET | Refills: 2 | Status: SHIPPED | OUTPATIENT
Start: 2021-02-11 | End: 2021-06-09

## 2021-02-11 NOTE — TELEPHONE ENCOUNTER
LM to return call
Please inform patient that her total cholesterol has gone up to 273, LDL cholesterol has gone up to 178, triglycerides and HDL is okay. Recommend to start on atorvastatin 10 mg at nighttime.   Continue with low-cholesterol diet, exercise, fish oil suppleme
Spoke to pt and informed her of the below results and recommendations. She states that is fine and she does understand. She says that she will take the medication. rx sent to [facility] DR LAURENCE RAMIREZ.  No further questions at this time
Shortness of breath

## 2021-03-01 ENCOUNTER — OFFICE VISIT (OUTPATIENT)
Dept: NEUROLOGY | Facility: CLINIC | Age: 66
End: 2021-03-01
Payer: COMMERCIAL

## 2021-03-01 VITALS
BODY MASS INDEX: 24.32 KG/M2 | DIASTOLIC BLOOD PRESSURE: 68 MMHG | RESPIRATION RATE: 16 BRPM | HEIGHT: 65 IN | WEIGHT: 146 LBS | HEART RATE: 74 BPM | SYSTOLIC BLOOD PRESSURE: 120 MMHG

## 2021-03-01 DIAGNOSIS — G25.2 KINETIC TREMOR: Primary | ICD-10-CM

## 2021-03-01 PROCEDURE — 3074F SYST BP LT 130 MM HG: CPT | Performed by: OTHER

## 2021-03-01 PROCEDURE — 99243 OFF/OP CNSLTJ NEW/EST LOW 30: CPT | Performed by: OTHER

## 2021-03-01 PROCEDURE — 3078F DIAST BP <80 MM HG: CPT | Performed by: OTHER

## 2021-03-01 PROCEDURE — 3008F BODY MASS INDEX DOCD: CPT | Performed by: OTHER

## 2021-03-01 NOTE — PROGRESS NOTES
[id number] [facility]  3/1/2021    10:35 AM      [address] [facility]     First noticed a \"weakening\" on the left arm many years ago and then, the last 2-3 years, began noticing it shake.   She noticed it maría Pulmonary Disease in her paternal grandfather.     /68 (BP Location: Left arm, Patient Position: Sitting, Cuff Size: adult)   Pulse 74   Resp 16   Ht 65\"   Wt 146 lb (66.2 kg)   BMI 24.30 kg/m²    Appears stated age  HENT:  Pink conjunctiva, anicteri

## 2021-06-09 ENCOUNTER — TELEPHONE (OUTPATIENT)
Dept: FAMILY MEDICINE CLINIC | Facility: CLINIC | Age: 66
End: 2021-06-09

## 2021-06-09 DIAGNOSIS — E78.00 PURE HYPERCHOLESTEROLEMIA: Primary | ICD-10-CM

## 2021-06-09 RX ORDER — ATORVASTATIN CALCIUM 10 MG/1
TABLET, FILM COATED ORAL
Qty: 30 TABLET | Refills: 0 | Status: SHIPPED | OUTPATIENT
Start: 2021-06-09 | End: 2021-06-12

## 2021-06-09 NOTE — TELEPHONE ENCOUNTER
Spoke to pt     Future Appointments   Date Time Provider Lisa Noa   6/21/2021  9:15 AM [facility] [place] [facility]   6/22/2021  3:00 PM Ebony Frey MD [facility] [place] [facility]

## 2021-06-09 NOTE — TELEPHONE ENCOUNTER
Future appt:     Last Appointment with provider: 1/22/21- advised Return in about 1 year (around 1/22/2022) for physical.  Last refill: 2/11/21- atorvastatin      Last appointment at [facility]:  2/5/2021  CHOLESTEROL, TOTAL (mg/dL)   Date Value   02/09/

## 2021-06-09 NOTE — TELEPHONE ENCOUNTER
She was due for follow-up in May. I have placed order for lab. Recommend to schedule follow-up appointment with me after lab recheck.

## 2021-06-12 RX ORDER — ATORVASTATIN CALCIUM 10 MG/1
TABLET, FILM COATED ORAL
Qty: 30 TABLET | Refills: 0 | Status: SHIPPED | OUTPATIENT
Start: 2021-06-12

## 2021-06-12 NOTE — TELEPHONE ENCOUNTER
Future appt:     Your appointments     Date & Time Appointment Department [facility])    Jun 21, 2021  9:15 AM CDT Laboratory Visit with REF [facility] ([facility])        Jun 22, 2021  3:00 PM CDT Follow Up Visit with Jim Lawton

## 2021-06-21 ENCOUNTER — LAB ENCOUNTER (OUTPATIENT)
Dept: LAB | Age: 66
End: 2021-06-21
Attending: FAMILY MEDICINE
Payer: MEDICARE

## 2021-06-21 DIAGNOSIS — E78.00 PURE HYPERCHOLESTEROLEMIA: ICD-10-CM

## 2021-06-21 PROCEDURE — 80061 LIPID PANEL: CPT

## 2021-06-21 PROCEDURE — 80053 COMPREHEN METABOLIC PANEL: CPT

## 2021-06-21 PROCEDURE — 36415 COLL VENOUS BLD VENIPUNCTURE: CPT

## 2022-02-25 ENCOUNTER — PATIENT OUTREACH (OUTPATIENT)
Dept: FAMILY MEDICINE CLINIC | Facility: CLINIC | Age: 67
End: 2022-02-25

## 2022-02-25 NOTE — PROGRESS NOTES
Called pt to schedule 2022 Wellness exam and care gaps. Pt needs to complete FIT test and Welcome to Medicare exam - She does not want to schedule at this time and will c/b. She is aware her WTME must be done before her birthday for Medicare.

## 2022-03-22 ENCOUNTER — TELEPHONE (OUTPATIENT)
Dept: FAMILY MEDICINE CLINIC | Facility: CLINIC | Age: 67
End: 2022-03-22

## 2022-03-22 DIAGNOSIS — Z12.11 COLON CANCER SCREENING: Primary | ICD-10-CM

## 2022-03-29 ENCOUNTER — OFFICE VISIT (OUTPATIENT)
Dept: FAMILY MEDICINE CLINIC | Facility: CLINIC | Age: 67
End: 2022-03-29
Payer: MEDICARE

## 2022-03-29 VITALS
TEMPERATURE: 97 F | WEIGHT: 138.81 LBS | HEART RATE: 64 BPM | HEIGHT: 65.5 IN | SYSTOLIC BLOOD PRESSURE: 118 MMHG | RESPIRATION RATE: 16 BRPM | BODY MASS INDEX: 22.85 KG/M2 | DIASTOLIC BLOOD PRESSURE: 64 MMHG

## 2022-03-29 DIAGNOSIS — M25.512 ACUTE PAIN OF LEFT SHOULDER: ICD-10-CM

## 2022-03-29 DIAGNOSIS — M79.602 LEFT ARM PAIN: ICD-10-CM

## 2022-03-29 DIAGNOSIS — M54.2 NECK PAIN: Primary | ICD-10-CM

## 2022-03-29 DIAGNOSIS — Z12.31 BREAST CANCER SCREENING BY MAMMOGRAM: ICD-10-CM

## 2022-03-29 PROCEDURE — 99214 OFFICE O/P EST MOD 30 MIN: CPT | Performed by: FAMILY MEDICINE

## 2022-03-29 RX ORDER — CYCLOBENZAPRINE HCL 5 MG
5 TABLET ORAL NIGHTLY PRN
Qty: 30 TABLET | Refills: 0 | Status: SHIPPED | OUTPATIENT
Start: 2022-03-29

## 2022-03-29 RX ORDER — METHYLPREDNISOLONE 4 MG/1
TABLET ORAL
Qty: 21 EACH | Refills: 0 | Status: SHIPPED | OUTPATIENT
Start: 2022-03-29

## 2022-03-29 NOTE — PATIENT INSTRUCTIONS
Advice rest, heating pad, tylenol as needed   Start steroid pack   Take cyclobenzaprine as needed. Medication will make you drowsy, so no driving after taking medication. Start physical therapy. Return to clinic if no improvement.

## 2022-04-04 ENCOUNTER — TELEPHONE (OUTPATIENT)
Dept: FAMILY MEDICINE CLINIC | Facility: CLINIC | Age: 67
End: 2022-04-04

## 2022-04-04 NOTE — TELEPHONE ENCOUNTER
Referral for physical therapy was placed at [facility] on 3/29/22. Patient states she was instructed to call back to see if insurance approved the physical therapy.

## 2022-04-13 ENCOUNTER — OFFICE VISIT (OUTPATIENT)
Dept: FAMILY MEDICINE CLINIC | Facility: CLINIC | Age: 67
End: 2022-04-13
Payer: MEDICARE

## 2022-04-13 ENCOUNTER — LAB ENCOUNTER (OUTPATIENT)
Dept: LAB | Age: 67
End: 2022-04-13
Attending: FAMILY MEDICINE
Payer: MEDICARE

## 2022-04-13 VITALS
DIASTOLIC BLOOD PRESSURE: 68 MMHG | SYSTOLIC BLOOD PRESSURE: 110 MMHG | TEMPERATURE: 97 F | RESPIRATION RATE: 16 BRPM | HEART RATE: 70 BPM | HEIGHT: 66.25 IN | WEIGHT: 139.63 LBS | BODY MASS INDEX: 22.44 KG/M2 | OXYGEN SATURATION: 96 %

## 2022-04-13 DIAGNOSIS — I73.9 PERIPHERAL VASCULAR DISEASE (HCC): ICD-10-CM

## 2022-04-13 DIAGNOSIS — Z78.0 POST-MENOPAUSE: ICD-10-CM

## 2022-04-13 DIAGNOSIS — Z00.00 ENCOUNTER FOR ANNUAL HEALTH EXAMINATION: ICD-10-CM

## 2022-04-13 DIAGNOSIS — Z00.00 ENCOUNTER FOR MEDICARE ANNUAL WELLNESS EXAM: Primary | ICD-10-CM

## 2022-04-13 DIAGNOSIS — Z23 NEED FOR VACCINATION: ICD-10-CM

## 2022-04-13 DIAGNOSIS — Z13.6 SCREENING FOR CARDIOVASCULAR CONDITION: ICD-10-CM

## 2022-04-13 DIAGNOSIS — E78.00 PURE HYPERCHOLESTEROLEMIA: ICD-10-CM

## 2022-04-13 PROCEDURE — G0403 EKG FOR INITIAL PREVENT EXAM: HCPCS | Performed by: FAMILY MEDICINE

## 2022-04-13 PROCEDURE — G0402 INITIAL PREVENTIVE EXAM: HCPCS | Performed by: FAMILY MEDICINE

## 2022-04-13 PROCEDURE — G0009 ADMIN PNEUMOCOCCAL VACCINE: HCPCS | Performed by: FAMILY MEDICINE

## 2022-04-13 PROCEDURE — 90732 PPSV23 VACC 2 YRS+ SUBQ/IM: CPT | Performed by: FAMILY MEDICINE

## 2022-04-13 RX ORDER — UBIDECARENONE 75 MG
250 CAPSULE ORAL DAILY
COMMUNITY

## 2022-04-13 RX ORDER — MULTIVITAMIN WITH IRON
100 TABLET ORAL DAILY
COMMUNITY

## 2022-12-07 ENCOUNTER — LAB ENCOUNTER (OUTPATIENT)
Dept: LAB | Age: 67
End: 2022-12-07
Attending: NURSE PRACTITIONER
Payer: MEDICARE

## 2022-12-07 ENCOUNTER — OFFICE VISIT (OUTPATIENT)
Dept: FAMILY MEDICINE CLINIC | Facility: CLINIC | Age: 67
End: 2022-12-07
Payer: MEDICARE

## 2022-12-07 VITALS
WEIGHT: 139.81 LBS | SYSTOLIC BLOOD PRESSURE: 108 MMHG | DIASTOLIC BLOOD PRESSURE: 64 MMHG | HEART RATE: 75 BPM | RESPIRATION RATE: 16 BRPM | BODY MASS INDEX: 22.47 KG/M2 | OXYGEN SATURATION: 97 % | HEIGHT: 66.25 IN | TEMPERATURE: 97 F

## 2022-12-07 DIAGNOSIS — H83.02 LABYRINTHITIS OF LEFT EAR: Primary | ICD-10-CM

## 2022-12-07 DIAGNOSIS — R42 DIZZINESS: ICD-10-CM

## 2022-12-07 LAB
ALBUMIN SERPL-MCNC: 3.7 G/DL (ref 3.4–5)
ALBUMIN/GLOB SERPL: 1.2 {RATIO} (ref 1–2)
ALP LIVER SERPL-CCNC: 85 U/L
ALT SERPL-CCNC: 17 U/L
ANION GAP SERPL CALC-SCNC: 4 MMOL/L (ref 0–18)
AST SERPL-CCNC: 19 U/L (ref 15–37)
BASOPHILS # BLD AUTO: 0.02 X10(3) UL (ref 0–0.2)
BASOPHILS NFR BLD AUTO: 0.4 %
BILIRUB SERPL-MCNC: 0.3 MG/DL (ref 0.1–2)
BUN BLD-MCNC: 12 MG/DL (ref 7–18)
CALCIUM BLD-MCNC: 9 MG/DL (ref 8.5–10.1)
CHLORIDE SERPL-SCNC: 106 MMOL/L (ref 98–112)
CO2 SERPL-SCNC: 29 MMOL/L (ref 21–32)
CREAT BLD-MCNC: 0.76 MG/DL
EOSINOPHIL # BLD AUTO: 0.12 X10(3) UL (ref 0–0.7)
EOSINOPHIL NFR BLD AUTO: 2.2 %
ERYTHROCYTE [DISTWIDTH] IN BLOOD BY AUTOMATED COUNT: 11.9 %
FASTING STATUS PATIENT QL REPORTED: NO
GFR SERPLBLD BASED ON 1.73 SQ M-ARVRAT: 86 ML/MIN/1.73M2 (ref 60–?)
GLOBULIN PLAS-MCNC: 3.2 G/DL (ref 2.8–4.4)
GLUCOSE BLD-MCNC: 98 MG/DL (ref 70–99)
HCT VFR BLD AUTO: 39.2 %
HGB BLD-MCNC: 12.9 G/DL
IMM GRANULOCYTES # BLD AUTO: 0 X10(3) UL (ref 0–1)
IMM GRANULOCYTES NFR BLD: 0 %
LYMPHOCYTES # BLD AUTO: 2.2 X10(3) UL (ref 1–4)
LYMPHOCYTES NFR BLD AUTO: 39.9 %
MCH RBC QN AUTO: 30.9 PG (ref 26–34)
MCHC RBC AUTO-ENTMCNC: 32.9 G/DL (ref 31–37)
MCV RBC AUTO: 94 FL
MONOCYTES # BLD AUTO: 0.58 X10(3) UL (ref 0.1–1)
MONOCYTES NFR BLD AUTO: 10.5 %
NEUTROPHILS # BLD AUTO: 2.6 X10 (3) UL (ref 1.5–7.7)
NEUTROPHILS # BLD AUTO: 2.6 X10(3) UL (ref 1.5–7.7)
NEUTROPHILS NFR BLD AUTO: 47 %
OSMOLALITY SERPL CALC.SUM OF ELEC: 288 MOSM/KG (ref 275–295)
PLATELET # BLD AUTO: 258 10(3)UL (ref 150–450)
POTASSIUM SERPL-SCNC: 4.4 MMOL/L (ref 3.5–5.1)
PROT SERPL-MCNC: 6.9 G/DL (ref 6.4–8.2)
RBC # BLD AUTO: 4.17 X10(6)UL
SODIUM SERPL-SCNC: 139 MMOL/L (ref 136–145)
WBC # BLD AUTO: 5.5 X10(3) UL (ref 4–11)

## 2022-12-07 PROCEDURE — 36415 COLL VENOUS BLD VENIPUNCTURE: CPT

## 2022-12-07 PROCEDURE — 85025 COMPLETE CBC W/AUTO DIFF WBC: CPT

## 2022-12-07 PROCEDURE — 80053 COMPREHEN METABOLIC PANEL: CPT

## 2022-12-07 PROCEDURE — 99214 OFFICE O/P EST MOD 30 MIN: CPT | Performed by: NURSE PRACTITIONER

## 2022-12-07 RX ORDER — MECLIZINE HCL 12.5 MG/1
12.5 TABLET ORAL 3 TIMES DAILY PRN
Qty: 30 TABLET | Refills: 0 | Status: SHIPPED | OUTPATIENT
Start: 2022-12-07

## 2022-12-07 NOTE — PATIENT INSTRUCTIONS
Discussed that labyrinthitis is an infection of the inner ear- typically it is viral and just takes time to resolve. You should turn your head slowly, and not make any sudden movements. Also avoid motion rides such as roller coasters, and going upside down. If dizziness is severe you should not drive.   May use Antivert as needed for dizziness, follow up if symptoms persist or increase       Chiropractor, Dr. Xander Marie [phone] - to \"reset the crystals\"

## 2022-12-20 ENCOUNTER — TELEPHONE (OUTPATIENT)
Dept: FAMILY MEDICINE CLINIC | Facility: CLINIC | Age: 67
End: 2022-12-20

## 2022-12-20 LAB — AMB EXT COVID-19 RESULT: DETECTED

## 2022-12-20 NOTE — TELEPHONE ENCOUNTER
symptoms started Sunday. Pt states her symptoms started this morning; sinus congestion and temp of 100.3. Home covid test positive for both her and her , today, 12/20. Informed pt of quarantine instructions and directions for symptomatic at home care. Offerred appointment for patient to discuss symptoms with a provider. Pt declined and states if symptoms get worse she will call back. No further questions or concerns at this time.

## 2022-12-20 NOTE — TELEPHONE ENCOUNTER
Tested positive for Covid, 12/20  Is there anything she can do or take? How long is quaratine? No future appointments.

## 2023-01-04 ENCOUNTER — TELEPHONE (OUTPATIENT)
Dept: FAMILY MEDICINE CLINIC | Facility: CLINIC | Age: 68
End: 2023-01-04

## 2023-01-04 NOTE — TELEPHONE ENCOUNTER
Spoke to pt informed that to fill paperwork out we would have needed to see pt - through a video visit at the time she was ill. Pt did a home test.  Pt did call in and was offered a virtual appt and did decline to make an appointment. Pt advised to go back to her work to see what else can be done. No further question. s

## 2023-01-04 NOTE — TELEPHONE ENCOUNTER
patient asking if we can fill out paperwork for her workplace regarding missing days for covid    Call back [phone]

## 2023-03-02 ENCOUNTER — TELEPHONE (OUTPATIENT)
Dept: FAMILY MEDICINE CLINIC | Facility: CLINIC | Age: 68
End: 2023-03-02

## 2023-03-02 DIAGNOSIS — Z12.11 SCREENING FOR COLON CANCER: Primary | ICD-10-CM

## 2023-03-31 ENCOUNTER — PATIENT OUTREACH (OUTPATIENT)
Dept: FAMILY MEDICINE CLINIC | Facility: CLINIC | Age: 68
End: 2023-03-31

## 2023-04-20 ENCOUNTER — OFFICE VISIT (OUTPATIENT)
Dept: FAMILY MEDICINE CLINIC | Facility: CLINIC | Age: 68
End: 2023-04-20
Payer: MEDICARE

## 2023-04-20 VITALS
SYSTOLIC BLOOD PRESSURE: 116 MMHG | RESPIRATION RATE: 18 BRPM | WEIGHT: 141.19 LBS | HEIGHT: 66 IN | TEMPERATURE: 98 F | HEART RATE: 63 BPM | DIASTOLIC BLOOD PRESSURE: 72 MMHG | OXYGEN SATURATION: 97 % | BODY MASS INDEX: 22.69 KG/M2

## 2023-04-20 DIAGNOSIS — T15.91XA FOREIGN BODY OF RIGHT EYE, INITIAL ENCOUNTER: ICD-10-CM

## 2023-04-20 DIAGNOSIS — H57.11 DISCOMFORT OF RIGHT EYE: ICD-10-CM

## 2023-04-20 PROCEDURE — 99214 OFFICE O/P EST MOD 30 MIN: CPT

## 2023-04-20 RX ORDER — OMEGA-3S/DHA/EPA/FISH OIL/D3 1150-1000
LIQUID (ML) ORAL DAILY
COMMUNITY

## 2023-04-20 NOTE — PATIENT INSTRUCTIONS
Ophthalmology referal, please don't leave yet we are calling them. We will give you more information. Continue to irrigate as needed. Most likely foreign body in your right eye.

## 2023-04-26 ENCOUNTER — PATIENT OUTREACH (OUTPATIENT)
Dept: FAMILY MEDICINE CLINIC | Facility: CLINIC | Age: 68
End: 2023-04-26

## (undated) NOTE — LETTER
[address], [address] [address]  [phone]            April 22, 2018      [address]      Dear Chandra Calderón:    Our office has been trying to contact

## (undated) NOTE — LETTER
Date: 12/7/2022    Patient Name: Tres Mehta          To Whom it may concern: This letter has been written at the patient's request. The above patient was seen at the [facility] for treatment of a medical condition. Please excuse patient from work December 9 and 10, 2022 due to labyrinthitis. The patient may return to work on December 16, 2022 with the following.         Sincerely,    JEANETTE Vazquez